# Patient Record
Sex: FEMALE | Race: OTHER | Employment: FULL TIME | URBAN - METROPOLITAN AREA
[De-identification: names, ages, dates, MRNs, and addresses within clinical notes are randomized per-mention and may not be internally consistent; named-entity substitution may affect disease eponyms.]

---

## 2020-06-10 ENCOUNTER — TELEPHONE (OUTPATIENT)
Dept: FAMILY MEDICINE CLINIC | Age: 50
End: 2020-06-10

## 2020-06-10 NOTE — TELEPHONE ENCOUNTER
----- Message from Louise Jose sent at 6/10/2020  3:37 PM EDT -----  Env General Message/Vendor Calls    Caller's first and last name:      Reason for call: Regarding scheduling a NP, R arm/leg, neck/back pain, numbness in lips, knees lock up      Call back required yes/no and why: Yes       Best contact number(s): [phone]      Details to clarify the request:      Louise Jose

## 2020-06-15 ENCOUNTER — VIRTUAL VISIT (OUTPATIENT)
Dept: FAMILY MEDICINE CLINIC | Age: 50
End: 2020-06-15

## 2020-06-15 DIAGNOSIS — J06.9 UPPER RESPIRATORY TRACT INFECTION, UNSPECIFIED TYPE: ICD-10-CM

## 2020-06-15 DIAGNOSIS — M79.601 RIGHT ARM PAIN: Primary | ICD-10-CM

## 2020-06-15 RX ORDER — ESCITALOPRAM OXALATE 20 MG/1
TABLET ORAL
COMMUNITY
Start: 2020-03-28 | End: 2022-07-27

## 2020-06-15 RX ORDER — FLUTICASONE PROPIONATE 50 MCG
SPRAY, SUSPENSION (ML) NASAL
COMMUNITY
Start: 2020-03-28 | End: 2022-07-27

## 2020-06-15 NOTE — PROGRESS NOTES
Halima Jack  47 y.o. female  1970  [facility] [address] [id number]  <[id number]>   [address]:    Telemedicine Progress Note  Sirisha Sheppard MD       Encounter Date and Time: Trinity 15, 2020 at 8:10 AM    Consent:  She and/or the health care decision maker is aware that that she may receive a bill for this telephone service, depending on her insurance coverage, and has provided verbal consent to proceed: Yes    Chief Complaint   Patient presents with    Arm Pain     History of Present Illness   Halima Jack is a 47 y.o. female was evaluated by synchronous (real-time) audio-video technology from home, through the [facility]. Primary doctor is Lorie Dutton. Patient complains of right arm pain for one week in duration coinciding with URI symptoms. Pain starts in right side of neck and descends. Patient feels like her muscles are tight in her neck and are worsened with cold weather. Pain improves with movement, heat, and are relieved by the ibuprofen 800 mg BID, and Benzonate that she is taking. [facility] and was given Metaxalone for the muscle cramping and benzonatate for the cough. Patient denies trauma to arm, neck, or head. Patient denies weakness in arm or sensory deficit. Patient also reports having cough (productive), nasal congestion, and subjective fever/chills for one week in duration, along with generalized myalgias. Patient reports h/o intermittent chronic Right arm/neck pain in the past, worsened with stress or sickness. Both URI symptoms and muscle pain are improving. Patient denies changes in sensation or weakness of right arm, headache, vision changes, and focal deficits/weakness, fever, CP, SOB, and n/v/d/c.        COVID Questions:   Experiencing any of the following symptoms: fever, chills, cough, SOB, diarrhea, URI symptoms. Yes  Any Sick contacts with fever, cough, diarrhea, SOB, URI symptoms. Yes  Traveled out of state or out of country. No  Lives with family. Has been staying at home. Yes    Review of Systems   Review of Systems   Constitutional: Positive for chills. HENT: Positive for congestion. Respiratory: Positive for cough. Negative for hemoptysis, shortness of breath and wheezing. Cardiovascular: Negative for chest pain and orthopnea. Gastrointestinal: Negative for abdominal pain, constipation, diarrhea, nausea and vomiting. Musculoskeletal: Positive for joint pain, myalgias and neck pain. Neurological: Negative for dizziness, focal weakness and headaches. Vitals/Objective:     General: alert, cooperative, no distress   Mental  status: mental status: alert, oriented to person, place, and time, normal mood, behavior, speech, dress, motor activity, and thought processes   Resp: resp: normal effort and no respiratory distress   Neuro: neuro: no gross deficits   Skin: skin: no discoloration or lesions of concern on visible areas   Due to this being a TeleHealth evaluation, many elements of the physical examination are unable to be assessed. Assessment and Plan:   Time-based coding, delete if not needed: I spent at least 15 minutes with this established patient, and >50% of the time was spent counseling and/or coordinating care regarding Right Arm Pain    Right arm pain: Given chronic nature and symptoms likely MSK in origin and exacerbated by URI. If continues would recommend further w/u starting with XR of cervical spine, cmp, cbc, and esr/crp.  - Continue Metaxalone as prescribed  - Continue heating pad and Ibuprofen  - Symptoms should resolved with URI  - Educated on good sleep hygeine, stretching exercises, and exercise as tolerated  - If symptoms do not resolve, patient instructed to call to schedule f/u in clinic appointment or f/u with PCP, 76 hours after URI symptoms have resolved    URI: Likely viral URI.   - Advised on the need to stay well hydrated and that symptoms can last up to 1.5 weeks with an additional 3 days on average for smokers  - Can use tylenol/motrin as needed for generalized muscle pain and fever  - Can use Sudafed for nasal congestion or nasal saline rinses 2-3 times daily  - Continue Benzonatate as directed, may also try Tea with honey  - Chloraseptic throat losenges and saline gargles (1 tsp salt in 8 oz water) for sore   throat  - Wash hand frequently and cough/sneeze into your sleeve to help prevent   infection of others   - Educated on the lack of benefit of antibiotics in a viral illness but advised to call if symptoms worsening  - Drink plenty of fluids  - Self quarantine  - Can be seen in clinic 72 hours after URI resolve if needed for Right arm pain or f/u with Dr. Billie Rodriges (PCP)      Time spent in direct conversation with the patient to include medical condition(s) discussed, assessment and treatment plan: >15 min       We discussed the expected course, resolution and complications of the diagnosis(es) in detail. Medication risks, benefits, costs, interactions, and alternatives were discussed as indicated. I advised her to contact the office if her condition worsens, changes or fails to improve as anticipated. She expressed understanding with the diagnosis(es) and plan. Patient understands that this encounter was a temporary measure, and the importance of further follow up and examination was emphasized. Patient verbalized understanding. Patient informed to follow up: as needed or if symptoms worsen with patient's PCP or with our clinic.     Electronically Signed: Yadira Chavez MD    Providers location when delivering service (clinic, hospital, home): home        Pursuant to the emergency declaration under the [facility], [facility] waiver authority and the Coronavirus Preparedness and Response Supplemental Appropriations Act, this Virtual  Visit was conducted, with patient's consent, to reduce the patient's risk of exposure to COVID-19 and provide continuity of care for an established patient. Services were provided through a video synchronous discussion virtually to substitute for in-person clinic visit. History   Patients past medical, surgical and family histories were reviewed and updated.       Past Medical History:   Diagnosis Date    Depression     Panic attacks     Pre-diabetes      Past Surgical History:   Procedure Laterality Date    HX  SECTION       Family History   Problem Relation Age of Onset    Arthritis Mother      Social History     Socioeconomic History    Marital status:      Spouse name: Not on file    Number of children: Not on file    Years of education: Not on file    Highest education level: Not on file   Occupational History    Not on file   Social Needs    Financial resource strain: Not on file    Food insecurity     Worry: Not on file     Inability: Not on file    Transportation needs     Medical: Not on file     Non-medical: Not on file   Tobacco Use    Smoking status: Never Smoker    Smokeless tobacco: Never Used   Substance and Sexual Activity    Alcohol use: Not Currently     Frequency: Never    Drug use: Never    Sexual activity: Yes   Lifestyle    Physical activity     Days per week: Not on file     Minutes per session: Not on file    Stress: Not on file   Relationships    Social connections     Talks on phone: Not on file     Gets together: Not on file     Attends Congregational service: Not on file     Active member of club or organization: Not on file     Attends meetings of clubs or organizations: Not on file     Relationship status: Living with partner    Intimate partner violence     Fear of current or ex partner: Not on file     Emotionally abused: Not on file     Physically abused: Not on file     Forced sexual activity: Not on file   Other Topics Concern    Not on file   Social History Narrative    Not on file     There is no problem list on file for this patient.          Current Medications/Allergies   Medications and Allergies reviewed:    Current Outpatient Medications   Medication Sig Dispense Refill    escitalopram oxalate (LEXAPRO) 20 mg tablet TOME JUAN R TABLETA TODOS LOS D AS      fluticasone propionate (FLONASE) 50 mcg/actuation nasal spray INSTILL 2 SPRAYS IN EACH NOSTRIL DAILY       Not on File

## 2020-06-15 NOTE — PROGRESS NOTES
[address] Medicine Residency Attending Addendum:  Dr. Blanca Saenz MD,  the patient and I were not physically present during this encounter. The resident and I are concurrently monitoring the patient care through appropriate telecommunication technology. I discussed the findings, assessment and plan with the resident and agree with the resident's findings and plan as documented in the resident's note.       Fina Alexander MD

## 2020-06-17 ENCOUNTER — TELEPHONE (OUTPATIENT)
Dept: FAMILY MEDICINE CLINIC | Age: 50
End: 2020-06-17

## 2020-06-17 NOTE — TELEPHONE ENCOUNTER
----- Message from Steven Zarate sent at 6/16/2020  4:09 PM EDT -----  Regarding: Dr. Jacquie Rudolph: pt    Reason: The patient would like to inquire on scheduling an appointment with Dr. Nahed Wilcox for a bone examination.     Best contact number(s): [phone]

## 2021-12-08 ENCOUNTER — TRANSCRIBE ORDER (OUTPATIENT)
Dept: SCHEDULING | Age: 51
End: 2021-12-08

## 2021-12-08 DIAGNOSIS — Z12.31 OTHER SCREENING MAMMOGRAM: Primary | ICD-10-CM

## 2022-01-13 ENCOUNTER — TRANSCRIBE ORDER (OUTPATIENT)
Dept: SCHEDULING | Age: 52
End: 2022-01-13

## 2022-01-13 DIAGNOSIS — Z12.31 VISIT FOR SCREENING MAMMOGRAM: Primary | ICD-10-CM

## 2022-01-19 ENCOUNTER — HOSPITAL ENCOUNTER (OUTPATIENT)
Dept: MAMMOGRAPHY | Age: 52
Discharge: HOME | End: 2022-01-19
Payer: COMMERCIAL

## 2022-01-19 DIAGNOSIS — Z12.31 VISIT FOR SCREENING MAMMOGRAM: ICD-10-CM

## 2022-01-19 PROCEDURE — 77063 BREAST TOMOSYNTHESIS BI: CPT

## 2022-07-27 ENCOUNTER — HOSPITAL ENCOUNTER (EMERGENCY)
Age: 52
Discharge: HOME | End: 2022-07-27
Attending: EMERGENCY MEDICINE
Payer: COMMERCIAL

## 2022-07-27 ENCOUNTER — APPOINTMENT (OUTPATIENT)
Dept: GENERAL RADIOLOGY | Age: 52
End: 2022-07-27
Attending: EMERGENCY MEDICINE
Payer: COMMERCIAL

## 2022-07-27 VITALS
BODY MASS INDEX: 36.68 KG/M2 | TEMPERATURE: 97.7 F | HEART RATE: 54 BPM | SYSTOLIC BLOOD PRESSURE: 166 MMHG | OXYGEN SATURATION: 96 % | DIASTOLIC BLOOD PRESSURE: 112 MMHG | RESPIRATION RATE: 16 BRPM | HEIGHT: 57 IN | WEIGHT: 170 LBS

## 2022-07-27 DIAGNOSIS — S46.911A RIGHT SHOULDER STRAIN, INITIAL ENCOUNTER: Primary | ICD-10-CM

## 2022-07-27 PROCEDURE — 73030 X-RAY EXAM OF SHOULDER: CPT

## 2022-07-27 PROCEDURE — 74011250637 HC RX REV CODE- 250/637: Performed by: EMERGENCY MEDICINE

## 2022-07-27 PROCEDURE — 99283 EMERGENCY DEPT VISIT LOW MDM: CPT

## 2022-07-27 RX ORDER — ERGOCALCIFEROL 1.25 MG/1
CAPSULE ORAL
COMMUNITY
Start: 2022-07-13

## 2022-07-27 RX ORDER — IBUPROFEN 600 MG/1
600 TABLET ORAL
Status: COMPLETED | OUTPATIENT
Start: 2022-07-27 | End: 2022-07-27

## 2022-07-27 RX ADMIN — IBUPROFEN 600 MG: 600 TABLET, FILM COATED ORAL at 04:51

## 2022-07-27 NOTE — ED PROVIDER NOTES
Pt was at work tonight when shelving unit fell off pallet hitting patient on R shoulder. Pt reports pain to top of R shoulder and R neck. Worsened with movement. NO weakness or numbness. Took 2 Tylenol with little improvement. Denies head injury or LOC. Past Medical History:   Diagnosis Date    Depression     Panic attacks     Pre-diabetes        Past Surgical History:   Procedure Laterality Date    HX  SECTION           Family History:   Problem Relation Age of Onset    Arthritis Mother        Social History     Socioeconomic History    Marital status: SINGLE     Spouse name: Not on file    Number of children: Not on file    Years of education: Not on file    Highest education level: Not on file   Occupational History    Not on file   Tobacco Use    Smoking status: Never    Smokeless tobacco: Never   Substance and Sexual Activity    Alcohol use: Not Currently    Drug use: Never    Sexual activity: Yes   Other Topics Concern    Not on file   Social History Narrative    Not on file     Social Determinants of Health     Financial Resource Strain: Not on file   Food Insecurity: Not on file   Transportation Needs: Not on file   Physical Activity: Not on file   Stress: Not on file   Social Connections: Not on file   Intimate Partner Violence: Not on file   Housing Stability: Not on file         ALLERGIES: Patient has no known allergies. Review of Systems   Constitutional:  Negative for fever. HENT:  Negative for facial swelling. Eyes:  Negative for visual disturbance. Respiratory:  Negative for chest tightness. Cardiovascular:  Negative for chest pain. Gastrointestinal:  Negative for abdominal pain. Genitourinary:  Negative for difficulty urinating and dysuria. Musculoskeletal:  Negative for arthralgias. Skin:  Negative for rash. Neurological:  Negative for headaches. Hematological:  Negative for adenopathy. Psychiatric/Behavioral:  Negative for suicidal ideas. Vitals:    07/27/22 0427   BP: (!) 166/112   Pulse: (!) 54   Resp: 16   Temp: 97.7 °F (36.5 °C)   SpO2: 96%   Weight: 77.1 kg (170 lb)   Height: 4' 9\" (1.448 m)            Physical Exam  Vitals and nursing note reviewed. Constitutional:       General: She is not in acute distress. Appearance: She is well-developed. HENT:      Head: Normocephalic and atraumatic. Eyes:      General: No scleral icterus. Conjunctiva/sclera: Conjunctivae normal.      Pupils: Pupils are equal, round, and reactive to light. Cardiovascular:      Rate and Rhythm: Normal rate. Heart sounds: No murmur heard. Pulmonary:      Effort: Pulmonary effort is normal. No respiratory distress. Abdominal:      General: There is no distension. Musculoskeletal:         General: Normal range of motion. Cervical back: Normal range of motion and neck supple. Skin:     General: Skin is warm and dry. Findings: No rash. Neurological:      Mental Status: She is alert and oriented to person, place, and time. MDM  Number of Diagnoses or Management Options  Right shoulder strain, initial encounter  Diagnosis management comments: A:  xray unremarkable. Likely contusion/sprain. Stable for discharge home for symptomatic treatment.        Amount and/or Complexity of Data Reviewed  Tests in the radiology section of CPT®: reviewed           Procedures

## 2022-07-27 NOTE — ED TRIAGE NOTES
Patient to ED for R neck, R shoulder, and R arm pain since 1am. Patient reports 12foot tall heavy cases slide off the rack while at work and injured her R side. Patient reports she took 2 extra strength Tylenol around 2am.     Bed in lowest locked position. Call bell within reach. Assessment complete, patient updated on plan of care. Emergency Department Nursing Plan of Care       The Nursing Plan of Care is developed from the Nursing assessment and Emergency Department Attending provider initial evaluation. The plan of care may be reviewed in the ED Provider note.     The Plan of Care was developed with the following considerations:   Patient / Family readiness to learn indicated by:verbalized understanding  Persons(s) to be included in education: patient  Barriers to Learning/Limitations:No    Signed     Marcos Jimenez RN    7/27/2022   4:27 AM

## 2022-07-27 NOTE — ED NOTES
POC preg negative, patient given ibuprofen. Patient (s)  given copy of dc instructions and 0 paper script(s) and 0 electronic scripts. Patient (s)  verbalized understanding of instructions and script (s). Patient given a current medication reconciliation form and verbalized understanding of their medications. Patient (s) verbalized understanding of the importance of discussing medications with  his or her physician or clinic they will be following up with. Patient alert and oriented and in no acute distress. Patient offered wheelchair from treatment area to hospital entrance, patient declined wheelchair.

## 2022-07-27 NOTE — Clinical Note
[address]  [facility] EMERGENCY DEPT  [id number] [phone]    Work/School Note    Date: 7/27/2022    To Whom It May concern:    Yoseph Sanchez was seen and treated today in the emergency room by the following provider(s):  Attending Provider: Cuco Vela MD.      Yoseph Sanchez is excused from work/school on 07/27/22 and 07/28/22. She is medically clear to return to work/school on 7/29/2022.        Sincerely,          Chivo Moss MD

## 2022-09-18 ENCOUNTER — APPOINTMENT (OUTPATIENT)
Dept: GENERAL RADIOLOGY | Age: 52
End: 2022-09-18
Attending: STUDENT IN AN ORGANIZED HEALTH CARE EDUCATION/TRAINING PROGRAM
Payer: COMMERCIAL

## 2022-09-18 ENCOUNTER — HOSPITAL ENCOUNTER (EMERGENCY)
Age: 52
Discharge: HOME | End: 2022-09-19
Attending: STUDENT IN AN ORGANIZED HEALTH CARE EDUCATION/TRAINING PROGRAM
Payer: COMMERCIAL

## 2022-09-18 DIAGNOSIS — Z20.822 SUSPECTED COVID-19 VIRUS INFECTION: Primary | ICD-10-CM

## 2022-09-18 LAB — SARS-COV-2, COV2: NORMAL

## 2022-09-18 PROCEDURE — 71046 X-RAY EXAM CHEST 2 VIEWS: CPT

## 2022-09-18 PROCEDURE — 74011250637 HC RX REV CODE- 250/637: Performed by: STUDENT IN AN ORGANIZED HEALTH CARE EDUCATION/TRAINING PROGRAM

## 2022-09-18 PROCEDURE — U0005 INFEC AGEN DETEC AMPLI PROBE: HCPCS

## 2022-09-18 PROCEDURE — 99283 EMERGENCY DEPT VISIT LOW MDM: CPT

## 2022-09-18 RX ORDER — DEXAMETHASONE SODIUM PHOSPHATE 10 MG/ML
10 INJECTION INTRAMUSCULAR; INTRAVENOUS ONCE
Status: COMPLETED | OUTPATIENT
Start: 2022-09-18 | End: 2022-09-18

## 2022-09-18 RX ADMIN — DEXAMETHASONE SODIUM PHOSPHATE 10 MG: 10 INJECTION, SOLUTION INTRAMUSCULAR; INTRAVENOUS at 23:48

## 2022-09-19 VITALS
HEART RATE: 73 BPM | HEIGHT: 57 IN | WEIGHT: 170 LBS | DIASTOLIC BLOOD PRESSURE: 98 MMHG | SYSTOLIC BLOOD PRESSURE: 138 MMHG | OXYGEN SATURATION: 97 % | RESPIRATION RATE: 20 BRPM | BODY MASS INDEX: 36.68 KG/M2 | TEMPERATURE: 98.4 F

## 2022-09-19 LAB
SARS-COV-2, XPLCVT: NOT DETECTED
SOURCE, COVRS: NORMAL

## 2022-09-19 RX ORDER — PREDNISONE 50 MG/1
50 TABLET ORAL DAILY
Qty: 3 TABLET | Refills: 0 | Status: SHIPPED | OUTPATIENT
Start: 2022-09-19 | End: 2022-09-22

## 2022-09-19 RX ORDER — ALBUTEROL SULFATE 90 UG/1
2 AEROSOL, METERED RESPIRATORY (INHALATION)
Qty: 18 G | Refills: 0 | Status: SHIPPED | OUTPATIENT
Start: 2022-09-19 | End: 2022-10-19

## 2022-09-19 NOTE — ED TRIAGE NOTES
Pt c/o sore throat and cough x 3 days and left ear pain that started today. Pt has been taking zyrtec with no relief.  Pt denies SOB and CP

## 2022-09-29 NOTE — ED PROVIDER NOTES
Patient is a 25-year-old female present emergency department complaining of sore throat, cough left ear pain. Patient states that symptoms have been persistent for the last 3 days has been taking Zyrtec without any relief denies any shortness of breath chest pain also denies any fevers. Patient also denies any nausea or vomiting. It is unsure of any recent sick contacts. Past Medical History:   Diagnosis Date    Depression     Panic attacks     Pre-diabetes        Past Surgical History:   Procedure Laterality Date    HX  SECTION           Family History:   Problem Relation Age of Onset    Arthritis Mother        Social History     Socioeconomic History    Marital status: SINGLE     Spouse name: Not on file    Number of children: Not on file    Years of education: Not on file    Highest education level: Not on file   Occupational History    Not on file   Tobacco Use    Smoking status: Never    Smokeless tobacco: Never   Substance and Sexual Activity    Alcohol use: Not Currently    Drug use: Never    Sexual activity: Yes   Other Topics Concern    Not on file   Social History Narrative    Not on file     Social Determinants of Health     Financial Resource Strain: Not on file   Food Insecurity: Not on file   Transportation Needs: Not on file   Physical Activity: Not on file   Stress: Not on file   Social Connections: Not on file   Intimate Partner Violence: Not on file   Housing Stability: Not on file         ALLERGIES: Patient has no known allergies. Review of Systems   Constitutional:  Negative for fever. HENT:  Positive for congestion and sore throat. Respiratory:  Positive for cough. All other systems reviewed and are negative. Vitals:    22 2232 22 2317   BP: 103/85 (!) 138/98   Pulse: 73    Resp: 18 20   Temp: 98.4 °F (36.9 °C)    SpO2: 98% 97%   Weight: 77.1 kg (170 lb)    Height: 4' 9\" (1.448 m)             Physical Exam  Vitals and nursing note reviewed. Constitutional:       Appearance: She is well-developed. HENT:      Head: Normocephalic and atraumatic. Left Ear: Swelling present. A middle ear effusion is present. Mouth/Throat:      Pharynx: Pharyngeal swelling and posterior oropharyngeal erythema present. No oropharyngeal exudate. Eyes:      Conjunctiva/sclera: Conjunctivae normal.      Pupils: Pupils are equal, round, and reactive to light. Cardiovascular:      Rate and Rhythm: Normal rate and regular rhythm. Heart sounds: Normal heart sounds. Pulmonary:      Effort: Pulmonary effort is normal.      Breath sounds: Normal breath sounds. Abdominal:      General: Bowel sounds are normal.      Palpations: Abdomen is soft. Musculoskeletal:      Cervical back: Normal range of motion. Skin:     General: Skin is warm. Neurological:      General: No focal deficit present. Mental Status: She is alert and oriented to person, place, and time. Psychiatric:         Mood and Affect: Mood normal.         Behavior: Behavior normal.        MDM  Number of Diagnoses or Management Options  Suspected COVID-19 virus infection  Diagnosis management comments: Viral URI versus COVID.   58-year-old female present emergency department with symptoms consistent with a viral URI will evaluate for COVID with PCR test.           Procedures